# Patient Record
Sex: FEMALE | Race: ASIAN | NOT HISPANIC OR LATINO | URBAN - METROPOLITAN AREA
[De-identification: names, ages, dates, MRNs, and addresses within clinical notes are randomized per-mention and may not be internally consistent; named-entity substitution may affect disease eponyms.]

---
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
## 2024-01-04 ENCOUNTER — PATIENT MESSAGE (OUTPATIENT)
Dept: OBSTETRICS AND GYNECOLOGY | Facility: CLINIC | Age: 19
End: 2024-01-04
Payer: COMMERCIAL

## 2024-01-08 ENCOUNTER — TELEPHONE (OUTPATIENT)
Dept: OBSTETRICS AND GYNECOLOGY | Facility: CLINIC | Age: 19
End: 2024-01-08
Payer: COMMERCIAL

## 2024-01-09 ENCOUNTER — OFFICE VISIT (OUTPATIENT)
Dept: OBSTETRICS AND GYNECOLOGY | Facility: CLINIC | Age: 19
End: 2024-01-09
Payer: COMMERCIAL

## 2024-01-09 VITALS
BODY MASS INDEX: 19.71 KG/M2 | DIASTOLIC BLOOD PRESSURE: 68 MMHG | SYSTOLIC BLOOD PRESSURE: 104 MMHG | WEIGHT: 107.13 LBS | HEIGHT: 62 IN

## 2024-01-09 DIAGNOSIS — Z00.00 WELL WOMAN EXAM WITHOUT GYNECOLOGICAL EXAM: Primary | ICD-10-CM

## 2024-01-09 DIAGNOSIS — Z30.41 ORAL CONTRACEPTIVE PILL SURVEILLANCE: ICD-10-CM

## 2024-01-09 DIAGNOSIS — Z11.3 ROUTINE SCREENING FOR STI (SEXUALLY TRANSMITTED INFECTION): ICD-10-CM

## 2024-01-09 PROCEDURE — 1159F MED LIST DOCD IN RCRD: CPT | Mod: CPTII,S$GLB,, | Performed by: STUDENT IN AN ORGANIZED HEALTH CARE EDUCATION/TRAINING PROGRAM

## 2024-01-09 PROCEDURE — 99395 PREV VISIT EST AGE 18-39: CPT | Mod: S$GLB,,, | Performed by: STUDENT IN AN ORGANIZED HEALTH CARE EDUCATION/TRAINING PROGRAM

## 2024-01-09 PROCEDURE — 3078F DIAST BP <80 MM HG: CPT | Mod: CPTII,S$GLB,, | Performed by: STUDENT IN AN ORGANIZED HEALTH CARE EDUCATION/TRAINING PROGRAM

## 2024-01-09 PROCEDURE — 1160F RVW MEDS BY RX/DR IN RCRD: CPT | Mod: CPTII,S$GLB,, | Performed by: STUDENT IN AN ORGANIZED HEALTH CARE EDUCATION/TRAINING PROGRAM

## 2024-01-09 PROCEDURE — 3008F BODY MASS INDEX DOCD: CPT | Mod: CPTII,S$GLB,, | Performed by: STUDENT IN AN ORGANIZED HEALTH CARE EDUCATION/TRAINING PROGRAM

## 2024-01-09 PROCEDURE — 99999 PR PBB SHADOW E&M-EST. PATIENT-LVL III: CPT | Mod: PBBFAC,,, | Performed by: STUDENT IN AN ORGANIZED HEALTH CARE EDUCATION/TRAINING PROGRAM

## 2024-01-09 PROCEDURE — 3074F SYST BP LT 130 MM HG: CPT | Mod: CPTII,S$GLB,, | Performed by: STUDENT IN AN ORGANIZED HEALTH CARE EDUCATION/TRAINING PROGRAM

## 2024-01-09 RX ORDER — DROSPIRENONE AND ETHINYL ESTRADIOL 0.02-3(28)
1 KIT ORAL DAILY
Qty: 84 TABLET | Refills: 4 | Status: SHIPPED | OUTPATIENT
Start: 2024-01-09 | End: 2024-01-25 | Stop reason: SDUPTHER

## 2024-01-09 NOTE — PROGRESS NOTES
HPI:  HPI: Pt is a 18 y.o.  female who presents for routine annual/birth control refill. Here with mom.   - taking COCs for skin benefits and now also for pregnancy prevention  - 1 lifetime sexual partner, feels safe, sexual acts are consensual; they are monogamous   - has questions about nexplanon    Contraception: yolie  STI screening: agrees to gc/ct screen     Cervical cancer screening:  due age 20   Gardasil: 3 of 3 complete  Breast cancer screening: n/a  Colon cancer screening: n/a    Family history   Patient is adopted    ROS:  GENERAL: Feeling well overall. Denies fever or chills.   SKIN: Denies rash or lesions.   HEAD: Denies head injury or headache.   NODES: Denies enlarged lymph nodes.   CHEST: Denies chest pain or shortness of breath.   CARDIOVASCULAR: Denies palpitations or left sided chest pain.   ABDOMEN: No abdominal pain, constipation, diarrhea, nausea, vomiting or rectal bleeding.   URINARY: No dysuria, hematuria, or burning on urination.  REPRODUCTIVE: See HPI.   BREASTS: Denies pain or lumps  HEMATOLOGIC: No easy bruisability or excessive bleeding.   MUSCULOSKELETAL: Denies joint pain or swelling.   NEUROLOGIC: Denies syncope or weakness.   PSYCHIATRIC: Denies depression, anxiety or mood swings.    PE:   APPEARANCE: Well nourished, well developed female in no acute distress.  RESPIRATORY: normal respiratory effort  EXTREMITIES: normal ROM, no edema bilaterally  NEURO: alert and oriented, normal gait  PSYCH: normal mood and affect      Diagnosis:  1. Well woman exam without gynecological exam    2. Routine screening for STI (sexually transmitted infection)    3. Oral contraceptive pill surveillance        Plan:     Orders Placed This Encounter    C. trachomatis/N. gonorrhoeae by AMP DNA    drospirenone-ethinyl estradioL (JASMIEL, Michael,) 3-0.02 mg per tablet     Discussed pros/cons continued LANETTE use vs nexplanon use including improved efficacy of nexplanon as well as potential side effects. For now she  fever will continue her pill. She is very good about daily adherence. She will let me know if this changes and she begins missing doses so we can arrange nexplanon, but for now continue oral birth control    Pt seen alongside mom as well as interviewed alone.     F/u 1 year or sooner as needed    Alta Roberts MD  Obstetrics & Gynecology   [facility]

## 2024-01-25 DIAGNOSIS — Z30.41 ORAL CONTRACEPTIVE PILL SURVEILLANCE: ICD-10-CM

## 2024-01-25 DIAGNOSIS — L20.89 OTHER ATOPIC DERMATITIS: ICD-10-CM

## 2024-01-25 RX ORDER — DROSPIRENONE AND ETHINYL ESTRADIOL 0.02-3(28)
1 KIT ORAL DAILY
Qty: 84 TABLET | Refills: 4 | Status: SHIPPED | OUTPATIENT
Start: 2024-01-25

## 2024-01-25 NOTE — TELEPHONE ENCOUNTER
----- Message from Katelyn Abreu sent at 1/25/2024  9:58 AM CST -----  Pt is calling to get a refill on drospirenone-ethinyl estradioL (JASMIEL, Michael,) 3-0.02 mg per tablet pt# [phone]    [facility]  [address] [place] [id number]  [phone]

## 2024-03-13 ENCOUNTER — OFFICE VISIT (OUTPATIENT)
Dept: INTERNAL MEDICINE | Facility: CLINIC | Age: 19
End: 2024-03-13
Payer: COMMERCIAL

## 2024-03-13 VITALS
OXYGEN SATURATION: 98 % | SYSTOLIC BLOOD PRESSURE: 110 MMHG | BODY MASS INDEX: 21.59 KG/M2 | WEIGHT: 117.31 LBS | DIASTOLIC BLOOD PRESSURE: 60 MMHG | HEART RATE: 90 BPM | HEIGHT: 62 IN

## 2024-03-13 DIAGNOSIS — J02.9 PHARYNGITIS, UNSPECIFIED ETIOLOGY: Primary | ICD-10-CM

## 2024-03-13 LAB
CTP QC/QA: YES
CTP QC/QA: YES
MOLECULAR STREP A: NEGATIVE
SARS-COV-2 RDRP RESP QL NAA+PROBE: NEGATIVE

## 2024-03-13 PROCEDURE — 3074F SYST BP LT 130 MM HG: CPT | Mod: CPTII,S$GLB,, | Performed by: INTERNAL MEDICINE

## 2024-03-13 PROCEDURE — 3078F DIAST BP <80 MM HG: CPT | Mod: CPTII,S$GLB,, | Performed by: INTERNAL MEDICINE

## 2024-03-13 PROCEDURE — 1159F MED LIST DOCD IN RCRD: CPT | Mod: CPTII,S$GLB,, | Performed by: INTERNAL MEDICINE

## 2024-03-13 PROCEDURE — 3008F BODY MASS INDEX DOCD: CPT | Mod: CPTII,S$GLB,, | Performed by: INTERNAL MEDICINE

## 2024-03-13 PROCEDURE — 99999 PR PBB SHADOW E&M-EST. PATIENT-LVL IV: CPT | Mod: PBBFAC,,, | Performed by: INTERNAL MEDICINE

## 2024-03-13 PROCEDURE — 99213 OFFICE O/P EST LOW 20 MIN: CPT | Mod: S$GLB,,, | Performed by: INTERNAL MEDICINE

## 2024-03-13 PROCEDURE — 87635 SARS-COV-2 COVID-19 AMP PRB: CPT | Mod: QW,S$GLB,, | Performed by: INTERNAL MEDICINE

## 2024-03-13 PROCEDURE — 87651 STREP A DNA AMP PROBE: CPT | Mod: QW,S$GLB,, | Performed by: INTERNAL MEDICINE

## 2024-03-13 PROCEDURE — 1160F RVW MEDS BY RX/DR IN RCRD: CPT | Mod: CPTII,S$GLB,, | Performed by: INTERNAL MEDICINE

## 2024-03-13 RX ORDER — CEPHALEXIN 500 MG/1
500 CAPSULE ORAL EVERY 12 HOURS
Qty: 20 CAPSULE | Refills: 0 | Status: SHIPPED | OUTPATIENT
Start: 2024-03-13

## 2024-03-13 NOTE — PATIENT INSTRUCTIONS
Ibuprofen 400 mg up to three times a day for pain relief.   Start cephalexin (Keflex) 500 mg 1 capsule twice a day for 10 days.   Warm salt water gargles.    Return to clinic as needed.

## 2024-03-13 NOTE — PROGRESS NOTES
Subjective:       Patient ID: Jessenia Granados is a 18 y.o. female.    Chief Complaint: Sore Throat      HPI  Jessenia Granados is a 18 y.o. year old female presents for urgent care evaluation of sore throat starting 4 days ago. Patient denies fever or chills.   Sore throat, stabbing pain, 3/10 in severity. Denies cough, no known sick contacts. Does have known seasonal allergies; takes zyrtec daily.    Review of Systems   Constitutional:  Negative for activity change, appetite change, fatigue, fever and unexpected weight change.   HENT:  Positive for sore throat and trouble swallowing. Negative for congestion, hearing loss, postnasal drip, sinus pressure, sinus pain, sneezing and voice change.    Eyes:  Negative for pain and discharge.   Respiratory:  Negative for cough, choking, chest tightness, shortness of breath and wheezing.    Cardiovascular:  Negative for chest pain, palpitations and leg swelling.   Gastrointestinal:  Negative for abdominal distention, abdominal pain, blood in stool, constipation, diarrhea, nausea and vomiting.   Endocrine: Negative for polydipsia and polyuria.   Genitourinary:  Negative for difficulty urinating and flank pain.   Musculoskeletal:  Negative for arthralgias, back pain, joint swelling, myalgias and neck pain.   Skin:  Negative for rash.   Neurological:  Negative for dizziness, tremors, seizures, weakness, numbness and headaches.   Psychiatric/Behavioral:  Negative for agitation. The patient is not nervous/anxious.          Past Medical History:   Diagnosis Date    ADHD (attention deficit hyperactivity disorder)     combined type    Amblyopia     Ansio- Amblyopia OS    Astigmatism of left eye     Behavior causing concern in adopted child 5/6/2014    Headache(784.0)         Prior to Admission medications    Medication Sig Start Date End Date Taking? Authorizing Provider   betamethasone dipropionate 0.05 % cream Apply 1 application topically 2 (two) times daily. 3/24/22   "Yes Provider, Historical   cetirizine (ZYRTEC) 10 MG tablet Take 10 mg by mouth once daily.   Yes Provider, Historical   dextroamphetamine (DEXEDRINE SPANSULE) 15 MG 24 hr capsule  5/25/17  Yes Provider, Historical   drospirenone-ethinyl estradioL (JASMIEL, 28,) 3-0.02 mg per tablet Take 1 tablet by mouth once daily. 1/25/24  Yes Alta Roberts MD   ibuprofen (ADVIL,MOTRIN) 600 MG tablet Starting the day before your period, take 1 tablet every 8 hours for 5-7 days 12/8/21  Yes Alta Roberts MD   OPZELURA 1.5 % Crea Apply topically. 11/28/22  Yes Provider, Historical   risperidone (RISPERDAL) 0.5 MG Tab  5/25/17  Yes Provider, Historical   sertraline (ZOLOFT) 100 MG tablet Take 100 mg by mouth. 4/3/23  Yes Provider, Historical   triamcinolone acetonide 0.1% (KENALOG) 0.1 % ointment AAA bid to fingertips PRN severe rash x 1-2 weeks until resolves 7/15/19  Yes Ashely Monae MD   VYVANSE 40 mg Cap  7/25/18  Yes Provider, Historical   fluticasone propionate (CUTIVATE) 0.005 % ointment Apply topically 2 (two) times daily.  Patient not taking: Reported on 1/9/2024 10/10/20   Inna Trevino MD   hydrOXYzine HCl (ATARAX) 25 MG tablet Take 1 tablet (25 mg total) by mouth 3 (three) times daily as needed for Itching.  Patient not taking: Reported on 1/9/2024 8/30/19   Pipo Ríos, NP   sertraline (ZOLOFT) 25 MG tablet 30 mg.  6/9/16   Provider, Historical        Past medical history, surgical history, and family medical history reviewed and updated as appropriate.    Medications and allergies reviewed.     Objective:          Vitals:    03/13/24 1517   BP: 110/60   BP Location: Right arm   Patient Position: Sitting   Pulse: 90   SpO2: 98%   Weight: 53.2 kg (117 lb 4.6 oz)   Height: 5' 2" (1.575 m)     Body mass index is 21.45 kg/m².  Physical Exam  Constitutional:       Appearance: She is well-developed.   HENT:      Head: Normocephalic and atraumatic.      Mouth/Throat:      Pharynx: Posterior " oropharyngeal erythema present.      Comments: Enlarged tonsils, erythematous, no exudates noted  Erythematous oropharynx  Cardiovascular:      Rate and Rhythm: Normal rate and regular rhythm.      Heart sounds: Normal heart sounds.   Pulmonary:      Effort: Pulmonary effort is normal. No respiratory distress.      Breath sounds: Normal breath sounds. No wheezing.   Abdominal:      General: Bowel sounds are normal. There is no distension.      Palpations: Abdomen is soft.      Tenderness: There is no abdominal tenderness.   Musculoskeletal:         General: No tenderness. Normal range of motion.      Cervical back: Normal range of motion.   Skin:     General: Skin is warm and dry.   Neurological:      Mental Status: She is alert and oriented to person, place, and time.      Cranial Nerves: No cranial nerve deficit.      Deep Tendon Reflexes: Reflexes are normal and symmetric.         Lab Results   Component Value Date    WBC 11.15 08/18/2005    HGB 14.0 07/31/2017    HCT 42.7 (H) 08/18/2005     08/18/2005    CHOL 181 03/20/2021    TRIG 51 03/20/2021    HDL 89 (H) 03/20/2021    TSH 1.3 08/18/2005    GLUF 79 03/20/2021       Assessment:       1. Pharyngitis, unspecified etiology          Plan:     Jessenia Morgan was seen today for sore throat.    Diagnoses and all orders for this visit:    Pharyngitis, unspecified etiology  -     POCT Strep A, Molecular  -     POCT COVID-19 Rapid Screening  -     POCT Influenza A/B Molecular  -     cephALEXin (KEFLEX) 500 MG capsule; Take 1 capsule (500 mg total) by mouth every 12 (twelve) hours.        Health maintenance reviewed with patient.     Follow up if symptoms worsen or fail to improve.    Gerson Keane MD  Internal Medicine / Primary Care  [facility]  3/13/2024

## 2024-07-10 ENCOUNTER — TELEPHONE (OUTPATIENT)
Dept: OBSTETRICS AND GYNECOLOGY | Facility: CLINIC | Age: 19
End: 2024-07-10
Payer: COMMERCIAL

## 2024-07-10 DIAGNOSIS — Z30.41 ORAL CONTRACEPTIVE PILL SURVEILLANCE: ICD-10-CM

## 2024-07-10 RX ORDER — DROSPIRENONE AND ETHINYL ESTRADIOL 0.02-3(28)
1 KIT ORAL
Qty: 84 TABLET | Refills: 1 | Status: SHIPPED | OUTPATIENT
Start: 2024-07-10

## 2024-07-10 NOTE — TELEPHONE ENCOUNTER
----- Message from Dariusz Pennington sent at 7/10/2024 12:00 PM CDT -----  Type: Patient Call Back    Who called:Areli Bass    What is the request in detail:In regards to proxy paper work    Can the clinic reply by [facility]?No    Would the patient rather a call back or a response via [facility]? Call    Best call back number:.[phone]    Additional Information:

## 2024-07-10 NOTE — TELEPHONE ENCOUNTER
Spoke with pt who state she would like to give her mother proxy access. Proxy acces was  renewed per pts request. Bob colmenares

## 2024-07-10 NOTE — TELEPHONE ENCOUNTER
Refill Decision Note   Jessenia Granados  is requesting a refill authorization.  Brief Assessment and Rationale for Refill:  Approve     Medication Therapy Plan: Rx sent to requested pharmacy since listed as preferred location in chart      Comments:     Note composed:4:08 PM 07/10/2024

## 2024-08-21 ENCOUNTER — PATIENT MESSAGE (OUTPATIENT)
Dept: OBSTETRICS AND GYNECOLOGY | Facility: CLINIC | Age: 19
End: 2024-08-21
Payer: COMMERCIAL

## 2024-08-21 ENCOUNTER — OFFICE VISIT (OUTPATIENT)
Dept: URGENT CARE | Facility: CLINIC | Age: 19
End: 2024-08-21
Payer: COMMERCIAL

## 2024-08-21 VITALS
BODY MASS INDEX: 19.39 KG/M2 | RESPIRATION RATE: 15 BRPM | HEIGHT: 62 IN | DIASTOLIC BLOOD PRESSURE: 66 MMHG | SYSTOLIC BLOOD PRESSURE: 90 MMHG | TEMPERATURE: 98 F | WEIGHT: 105.38 LBS | OXYGEN SATURATION: 99 % | HEART RATE: 96 BPM

## 2024-08-21 DIAGNOSIS — Z30.41 ORAL CONTRACEPTIVE PILL SURVEILLANCE: ICD-10-CM

## 2024-08-21 DIAGNOSIS — S69.92XA INJURY OF LEFT MIDDLE FINGER, INITIAL ENCOUNTER: Primary | ICD-10-CM

## 2024-08-21 DIAGNOSIS — S60.453A FOREIGN BODY OF LEFT MIDDLE FINGER: ICD-10-CM

## 2024-08-21 PROCEDURE — 73140 X-RAY EXAM OF FINGER(S): CPT | Mod: LT,S$GLB,, | Performed by: RADIOLOGY

## 2024-08-21 PROCEDURE — 99213 OFFICE O/P EST LOW 20 MIN: CPT | Mod: S$GLB,,, | Performed by: FAMILY MEDICINE

## 2024-08-21 RX ORDER — FLUOCINONIDE TOPICAL SOLUTION USP, 0.05% 0.5 MG/ML
SOLUTION TOPICAL 2 TIMES DAILY PRN
COMMUNITY
Start: 2024-08-13

## 2024-08-21 RX ORDER — CLOBETASOL PROPIONATE 0.5 MG/G
AEROSOL, FOAM TOPICAL 2 TIMES DAILY PRN
COMMUNITY
Start: 2024-08-14

## 2024-08-21 RX ORDER — CEPHALEXIN 500 MG/1
500 CAPSULE ORAL EVERY 8 HOURS
Qty: 30 CAPSULE | Refills: 0 | Status: SHIPPED | OUTPATIENT
Start: 2024-08-21 | End: 2024-08-31

## 2024-08-21 RX ORDER — DROSPIRENONE AND ETHINYL ESTRADIOL 0.02-3(28)
1 KIT ORAL DAILY
Qty: 84 TABLET | Refills: 1 | Status: SHIPPED | OUTPATIENT
Start: 2024-08-21

## 2024-08-21 NOTE — PROGRESS NOTES
"Subjective:      Patient ID: Jessenia Granados is a 19 y.o. female.    Vitals:  height is 5' 2" (1.575 m) and weight is 47.8 kg (105 lb 6.1 oz). Her oral temperature is 97.9 °F (36.6 °C). Her blood pressure is 90/66 and her pulse is 96. Her respiration is 15 and oxygen saturation is 99%.     Chief Complaint: Hand Pain    Pt presents today w/ lt middle finger injury ; onset approx 6x days ago. Pt reports she noticed a superficial wound to her lt middle fingertip; c/o tenderness of the area when pressure applied. Pt denies loss of ROM, loss of sensation , swelling , drainage , fever , emesis and loss of ability to bear full weight. Pt  cleaned the affected area with peroxide ;mild relief. Pt last tdap was in 2015.   Does not recall any injury or trauma, Does have CAT at home, but does not recall any bite or scratch      Hand Pain   The incident occurred 5 to 7 days ago. The incident occurred at home. The injury mechanism is unknown. Pain location: lt middle finger. The quality of the pain is described as aching. The pain does not radiate. The pain is at a severity of 1/10. The pain is mild. The pain has been Intermittent since the incident. Pertinent negatives include no chest pain, muscle weakness, numbness or tingling. The symptoms are aggravated by movement and palpation. Treatments tried: perioxide. The treatment provided mild relief.       Cardiovascular:  Negative for chest pain.   Neurological:  Negative for numbness.      Objective:     Physical Exam   Constitutional: She is oriented to person, place, and time. She appears well-developed. She is cooperative.  Non-toxic appearance. She does not appear ill. No distress.   HENT:   Head: Normocephalic and atraumatic.   Ears:   Right Ear: Hearing, tympanic membrane, external ear and ear canal normal.   Left Ear: Hearing, tympanic membrane, external ear and ear canal normal.   Nose: Nose normal. No mucosal edema, rhinorrhea or nasal deformity. No epistaxis. " Right sinus exhibits no maxillary sinus tenderness and no frontal sinus tenderness. Left sinus exhibits no maxillary sinus tenderness and no frontal sinus tenderness.   Mouth/Throat: Uvula is midline, oropharynx is clear and moist and mucous membranes are normal. No trismus in the jaw. Normal dentition. No uvula swelling. No posterior oropharyngeal erythema.   Eyes: Conjunctivae and lids are normal. Right eye exhibits no discharge. Left eye exhibits no discharge. No scleral icterus.   Neck: Trachea normal and phonation normal. Neck supple.   Cardiovascular: Normal rate, regular rhythm, normal heart sounds and normal pulses.   Pulmonary/Chest: Effort normal and breath sounds normal. No respiratory distress.   Abdominal: Normal appearance and bowel sounds are normal. She exhibits no distension and no mass. Soft. There is no abdominal tenderness.   Musculoskeletal: Normal range of motion.         General: No deformity. Normal range of motion.      Comments: Left middle finger volar aspect distal phalanx with small abraised area, minimal ttp  No red streaking  FROM     Neurological: She is alert and oriented to person, place, and time. She exhibits normal muscle tone. Coordination normal.   Skin: Skin is warm, dry, intact, not diaphoretic and not pale.   Psychiatric: Her speech is normal and behavior is normal. Judgment and thought content normal.   Nursing note and vitals reviewed.      Assessment:     1. Injury of left middle finger, initial encounter    2. Foreign body of left middle finger        Plan:       Injury of left middle finger, initial encounter  -     X-Ray Finger 2 or More Views Left; Future; Expected date: 08/21/2024    Foreign body of left middle finger    Other orders  -     cephALEXin (KEFLEX) 500 MG capsule; Take 1 capsule (500 mg total) by mouth every 8 (eight) hours. for 10 days  Dispense: 30 capsule; Refill: 0      Area explored after instillating 1 ml of lidocaine plain, small ? FB removed, band  aid place  No sign of infection

## 2024-09-23 ENCOUNTER — PATIENT MESSAGE (OUTPATIENT)
Dept: OPTOMETRY | Facility: CLINIC | Age: 19
End: 2024-09-23
Payer: COMMERCIAL

## 2024-11-04 ENCOUNTER — TELEPHONE (OUTPATIENT)
Dept: OBSTETRICS AND GYNECOLOGY | Facility: CLINIC | Age: 19
End: 2024-11-04
Payer: COMMERCIAL

## 2024-12-23 ENCOUNTER — OFFICE VISIT (OUTPATIENT)
Dept: OPTOMETRY | Facility: CLINIC | Age: 19
End: 2024-12-23
Payer: COMMERCIAL

## 2024-12-23 DIAGNOSIS — Z46.0 FITTING AND ADJUSTMENT OF SPECTACLES AND CONTACT LENSES: Primary | ICD-10-CM

## 2024-12-23 DIAGNOSIS — H52.223 REGULAR ASTIGMATISM OF BOTH EYES: Primary | ICD-10-CM

## 2024-12-23 DIAGNOSIS — Z30.41 ORAL CONTRACEPTIVE PILL SURVEILLANCE: ICD-10-CM

## 2024-12-23 PROCEDURE — 92014 COMPRE OPH EXAM EST PT 1/>: CPT | Mod: S$GLB,,, | Performed by: OPTOMETRIST

## 2024-12-23 PROCEDURE — 1159F MED LIST DOCD IN RCRD: CPT | Mod: CPTII,S$GLB,, | Performed by: OPTOMETRIST

## 2024-12-23 PROCEDURE — 99999 PR PBB SHADOW E&M-EST. PATIENT-LVL II: CPT | Mod: PBBFAC,,, | Performed by: OPTOMETRIST

## 2024-12-23 PROCEDURE — 92310 CONTACT LENS FITTING OU: CPT | Mod: CSM,,, | Performed by: OPTOMETRIST

## 2024-12-23 RX ORDER — LIDOCAINE 30 MG/G
CREAM TOPICAL
COMMUNITY
Start: 2024-12-16

## 2024-12-23 RX ORDER — DROSPIRENONE AND ETHINYL ESTRADIOL 0.02-3(28)
1 KIT ORAL DAILY
Qty: 84 TABLET | Refills: 1 | Status: SHIPPED | OUTPATIENT
Start: 2024-12-23

## 2024-12-23 NOTE — Clinical Note
Hi! Please Order contact lens trials (2 boxes each) OD Total 30 8.4/14.2 -1.25 OS Total 30 for Astigmatism 8.6/14.5 +0.50 -2.75 x 180   Thank you!

## 2024-12-23 NOTE — TELEPHONE ENCOUNTER
----- Message from Matt sent at 12/23/2024 11:10 AM CST -----  Regarding: Self [phone]  Type: RX Refill Request    Who Called:  Self     Have you contacted your pharmacy: yes     Refill     RX Name and Strength: drospirenone-ethinyl estradioL (LO-ZUMANDIMINE, 28,) 3-0.02 mg per tablet    Preferred Pharmacy with phone number:   [facility] - [place] - [address]  [address] [place] [address]  Phone: [phone] Fax: [phone]    Local or Mail Order:    Would the patient rather a call back or a response via [facility]? Call back     Best Call Back Number: [phone]     Additional Information: stated that she got 1 month supply, but needs the other 2 month supplies. Stated she is now out of the medication.     Thank you.

## 2024-12-23 NOTE — PROGRESS NOTES
HPI    Jessenia Granados is a 19 y.o. female who comes in with her mother, Arron,   for continued eye care. Jessenia has myopia of right eye, moderate astigmatism   of the left eye, and consequential amblyopia of the left eye.  Her last   exam with me was on 04/24/2023. At that she dispense Ultra for Astigmatism   and Air Optix Hydraglyde contact lenses were dispensed for trial. Today,   Jessenia explains that the contacts were causing irritation so she   discontinued them. She adds that her vision in her left eye isn't as sharp   as she would like it to be.    (--)blurred vision  (--)Headaches  (--)diplopia  (--)flashes  (--)floaters  (--)pain  (--)Itching  (--)tearing  (--)burning  (--)Dryness  (--) OTC Drops  (--)Photophobia      Last edited by Lobo Robledo, OD on 12/23/2024  1:57 PM.      Review of Systems   Constitutional:  Negative for chills, fever and malaise/fatigue.   HENT:  Negative for congestion.    Eyes:  Positive for blurred vision. Negative for double vision, photophobia, pain, discharge and redness.   Respiratory:  Negative for cough.    Gastrointestinal:  Negative for nausea and vomiting.   Neurological:  Negative for seizures.     For exam results, see encounter report    Assessment /Plan    1. 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 Good ocular health    Patient education; [facility] weeks; otherwise, RTC in 1 year, sooner as neededat [facility]

## 2025-01-30 ENCOUNTER — PATIENT MESSAGE (OUTPATIENT)
Dept: OPTOMETRY | Facility: CLINIC | Age: 20
End: 2025-01-30
Payer: COMMERCIAL

## 2025-01-31 ENCOUNTER — TELEPHONE (OUTPATIENT)
Dept: OPTOMETRY | Facility: CLINIC | Age: 20
End: 2025-01-31
Payer: COMMERCIAL

## 2025-01-31 NOTE — TELEPHONE ENCOUNTER
Talked to Jessenia to correct message that was sent to them indicating that contact lens trial were to be picked up at Cl department or [facility] [place] instead they will be ready for  at the Pediatric building check in desk by Monday morning 02/02/2025 and a new message will be send Monday morning with  location and times.

## 2025-02-17 DIAGNOSIS — Z30.41 ORAL CONTRACEPTIVE PILL SURVEILLANCE: ICD-10-CM

## 2025-02-17 RX ORDER — DROSPIRENONE AND ETHINYL ESTRADIOL 0.02-3(28)
1 KIT ORAL
Qty: 84 TABLET | Refills: 0 | Status: SHIPPED | OUTPATIENT
Start: 2025-02-17

## 2025-02-17 NOTE — TELEPHONE ENCOUNTER
Refill Routing Note   Medication(s) are not appropriate for processing by [facility] for the following reason(s):        Patient not seen by provider within 15 months    [facility] action(s):  Defer               Appointments  past 12m or future 3m with PCP    Date Provider   Last Visit   1/9/2024 Alta Roberts MD   Next Visit   Visit date not found Alta Roberts MD   ED visits in past 90 days: 0        Note composed:1:49 PM 02/17/2025

## 2025-03-10 ENCOUNTER — TELEPHONE (OUTPATIENT)
Dept: OPHTHALMOLOGY | Facility: CLINIC | Age: 20
End: 2025-03-10
Payer: COMMERCIAL

## 2025-05-12 DIAGNOSIS — Z30.41 ORAL CONTRACEPTIVE PILL SURVEILLANCE: ICD-10-CM

## 2025-05-12 NOTE — TELEPHONE ENCOUNTER
----- Message from Loli sent at 5/12/2025  2:34 PM CDT -----  Can the clinic reply in [facility]:YPlease refill the medication(s) listed below. Please call the patient when the prescription(s) is ready for  at this phone number   Medication #1 LO-ZUMANDIMINE, 28, 3-0.02 mg per tabletMedication #2 Preferred Pharmacy: [facility] ([place] - [address] Phone: [phone]: [phone]

## 2025-05-13 RX ORDER — DROSPIRENONE AND ETHINYL ESTRADIOL 0.02-3(28)
1 KIT ORAL DAILY
Qty: 84 TABLET | Refills: 0 | Status: SHIPPED | OUTPATIENT
Start: 2025-05-13

## 2025-05-20 ENCOUNTER — OFFICE VISIT (OUTPATIENT)
Dept: OBSTETRICS AND GYNECOLOGY | Facility: CLINIC | Age: 20
End: 2025-05-20
Payer: COMMERCIAL

## 2025-05-20 VITALS
BODY MASS INDEX: 18.86 KG/M2 | HEIGHT: 62 IN | DIASTOLIC BLOOD PRESSURE: 66 MMHG | SYSTOLIC BLOOD PRESSURE: 101 MMHG | WEIGHT: 102.5 LBS | HEART RATE: 85 BPM

## 2025-05-20 DIAGNOSIS — Z11.3 ROUTINE SCREENING FOR STI (SEXUALLY TRANSMITTED INFECTION): ICD-10-CM

## 2025-05-20 DIAGNOSIS — Z12.4 CERVICAL CANCER SCREENING: Primary | ICD-10-CM

## 2025-05-20 DIAGNOSIS — Z30.41 ORAL CONTRACEPTIVE PILL SURVEILLANCE: ICD-10-CM

## 2025-05-20 DIAGNOSIS — N76.0 ACUTE VAGINITIS: ICD-10-CM

## 2025-05-20 PROCEDURE — 99999 PR PBB SHADOW E&M-EST. PATIENT-LVL III: CPT | Mod: PBBFAC,,, | Performed by: STUDENT IN AN ORGANIZED HEALTH CARE EDUCATION/TRAINING PROGRAM

## 2025-05-20 PROCEDURE — 81515 NFCT DS BV&VAGINITIS DNA ALG: CPT | Performed by: STUDENT IN AN ORGANIZED HEALTH CARE EDUCATION/TRAINING PROGRAM

## 2025-05-20 PROCEDURE — 87491 CHLMYD TRACH DNA AMP PROBE: CPT | Performed by: STUDENT IN AN ORGANIZED HEALTH CARE EDUCATION/TRAINING PROGRAM

## 2025-05-20 RX ORDER — DROSPIRENONE AND ETHINYL ESTRADIOL 0.02-3(28)
1 KIT ORAL DAILY
Qty: 84 TABLET | Refills: 4 | Status: SHIPPED | OUTPATIENT
Start: 2025-05-20

## 2025-05-20 NOTE — PROGRESS NOTES
"ROUTINE ANNUAL GYN VISIT    HPI: This is a 19 y.o.    who presents for routine annual exam.   Concerns brought up today:  Vaginal discharge - recently treated for yeast/bv; was better but feels like the clumpy white discharge is returning. No irritation or itching.  Dyspareunia - severe L sided pelvic pain with sex. Inconsistent (not every time). Says had pelvic US done with student [facility] that was normal.     Contraception: COCs  STI screening: agrees to gc/ct    Cervical cancer screening:     Most recent: n/a   History abnormal: no   Gardasil: 3/3 complete    Breast cancer screening: n/a, due age 39   Colon cancer screening: n/a, due age 44    Family history breast cancer: no  Family history ovarian cancer: no  Family history colon cancer: no      OB History    Para Term  AB Living   0 0 0 0 0 0   SAB IAB Ectopic Multiple Live Births   0 0 0 0 0          OBJECTIVE:   /66 (Patient Position: Sitting)   Pulse 85   Ht 5' 2" (1.575 m)   Wt 46.5 kg (102 lb 8.2 oz)   LMP 05/15/2025   BMI 18.75 kg/m²      PE:   APPEARANCE: Well nourished, well developed, no acute distress.  NODES: no inguinal lymphadenopathy  BREASTS: Symmetrical, no skin changes or visible lesions. No palpable masses, nipple discharge or adenopathy bilaterally.  ABDOMEN: Soft. No tenderness or masses. No distention.   VULVA: No lesions. Normal external female genitalia.  URETHRAL MEATUS: Normal size and location, no lesions, no prolapse.  URETHRA: No masses, tenderness, or prolapse.  VAGINA: Moist. No lesions or lacerations noted. No abnormal discharge present. No odor present.   CERVIX: No lesions or discharge. No cervical motion tenderness.   UTERUS: Normal size, regular shape, mobile, non-tender.  ADNEXA: No tenderness. No fullness or masses palpated in the adnexal regions.   ANUS PERINEUM: Normal.      Diagnosis:  1. Cervical cancer screening    2. Oral contraceptive pill surveillance    3. Acute vaginitis    4. " Routine screening for STI (sexually transmitted infection)        Plan:     Jessenia Morgan was seen today for well woman.    Diagnoses and all orders for this visit:    Cervical cancer screening  -     Liquid-Based Pap Smear, Screening    Oral contraceptive pill surveillance  -     drospirenone-ethinyl estradioL (LO-ZUMANDIMINE, 28,) 3-0.02 mg per tablet; Take 1 tablet by mouth once daily.    Acute vaginitis  -     Vaginosis Screen by DNA Probe    Routine screening for STI (sexually transmitted infection)  -     C. trachomatis/N. gonorrhoeae by AMP DNA      WWE- pap collected (will be 20 in 2 weeks), gc/ct sent  OCP refill sent  Affirm sent  Pt will email us her pelvic US results. Declines PFPT referral for now as her partner is out of state for the summer so she does not expect for the pain to be an issue.  She can send message for PFPT referral if/when needed.    Patient was counseled today on the current  ACS guidelines for cervical cytology screening as well as the current recommendations for breast cancer screening.   She was counseled to follow up with her PCP for other routine health maintenance.        RTC 1 year or sooner PRN

## 2025-05-22 LAB
BACTERIAL VAGINOSIS DNA (OHS): NOT DETECTED
C TRACH DNA SPEC QL NAA+PROBE: NOT DETECTED
CANDIDA GLABRATA/KRUSEI DNA (OHS): NOT DETECTED
CANDIDA SPECIES DNA (OHS): DETECTED
CTGC SOURCE (OHS) ORD-325: NORMAL
N GONORRHOEA DNA UR QL NAA+PROBE: NOT DETECTED
TRICHOMONAS VAGINALIS DNA (OHS): NOT DETECTED

## 2025-05-23 ENCOUNTER — RESULTS FOLLOW-UP (OUTPATIENT)
Dept: OBSTETRICS AND GYNECOLOGY | Facility: CLINIC | Age: 20
End: 2025-05-23

## 2025-05-23 RX ORDER — FLUCONAZOLE 150 MG/1
TABLET ORAL
Qty: 1 TABLET | Refills: 1 | Status: SHIPPED | OUTPATIENT
Start: 2025-05-23

## 2025-05-26 ENCOUNTER — TELEPHONE (OUTPATIENT)
Dept: OBSTETRICS AND GYNECOLOGY | Facility: CLINIC | Age: 20
End: 2025-05-26
Payer: COMMERCIAL

## 2025-05-26 NOTE — TELEPHONE ENCOUNTER
Reached out to pt to discuss refill request. Pt did not answer, left vm for pt to give the office a call back    Will send pt portal message   ----- Message from Loli sent at 5/26/2025 12:16 PM CDT -----  Can the clinic reply in [facility]:YPlease refill the medication(s) listed below. Please call the patient when the prescription(s) is ready for  at this phone number   Medication #1 fluconazole (DIFLUCAN) 150 MG TabMedication #2 Preferred Pharmacy: [facility] ([place] - [address] Phone: [phone]: [phone]

## 2025-05-26 NOTE — TELEPHONE ENCOUNTER
Returned pts call. Discussed prescription refill request w/ pt. Advised pt to contact pharmacy for transfer of script. Pt vu and no further questions   ----- Message from Angeles sent at 5/26/2025  1:51 PM CDT -----  Regarding: callback  Pt would like a callback.MRN [id number] [phone]

## 2025-05-30 ENCOUNTER — PATIENT MESSAGE (OUTPATIENT)
Dept: OPTOMETRY | Facility: CLINIC | Age: 20
End: 2025-05-30
Payer: COMMERCIAL

## 2025-06-24 ENCOUNTER — PATIENT MESSAGE (OUTPATIENT)
Dept: OPTOMETRY | Facility: CLINIC | Age: 20
End: 2025-06-24
Payer: COMMERCIAL

## 2025-06-26 ENCOUNTER — TELEPHONE (OUTPATIENT)
Dept: OPTOMETRY | Facility: CLINIC | Age: 20
End: 2025-06-26
Payer: COMMERCIAL

## 2025-06-26 NOTE — TELEPHONE ENCOUNTER
Called and talked to patient mother about ordering new trial lenses ( Total 30 for astigmatism ) like the last trial, so we can refract over them to see why pt feels that power is to weak ( comfort was really good)   Alternatively Infuse for astigmatism would be available in higher cyl power if deemed necessary after overrefraction

## 2025-07-16 ENCOUNTER — TELEPHONE (OUTPATIENT)
Dept: OPTOMETRY | Facility: CLINIC | Age: 20
End: 2025-07-16
Payer: COMMERCIAL

## 2025-07-22 ENCOUNTER — OFFICE VISIT (OUTPATIENT)
Dept: OPTOMETRY | Facility: CLINIC | Age: 20
End: 2025-07-22
Payer: COMMERCIAL

## 2025-07-22 DIAGNOSIS — Z46.0 FITTING AND ADJUSTMENT OF SPECTACLES AND CONTACT LENSES: Primary | ICD-10-CM

## 2025-07-22 PROCEDURE — 92310 CONTACT LENS FITTING OU: CPT | Mod: CSM,,, | Performed by: OPTOMETRIST

## 2025-07-22 NOTE — PROGRESS NOTES
HPI    Jessenia Granados is a 20 y.o. female who returns for continued a   contact lens fitting. She has myopia of the right eye and astigmatism of   the left eye. Today, the following trials will be dispensed:    OD Total 30 8.4/14.2 -1.25  OS Total 30 for Astigmatism 8.6/14.5 +0.50 -2.25 x 180    (+)blurred vision  (--)Headaches  (--)diplopia  (--)flashes  (--)floaters  (--)pain  (--)Itching  (--)tearing  (--)burning  (--)Dryness  (--) OTC Drops  (--)Photophobia      Jessenia reports the lenses feel comfortable but she is still not seeing well   in the left eye compared to the right eye.  Last edited by Lobo Robledo, OD on 7/22/2025  2:06 PM.        For exam results, see encounter report    Assessment /Plan    Good fit, comfort and VA with Total 30 and Total 30 for Astigmatism    - CLRx per below for 1 month disposal/replacement    -Advised against overnight wear, risks of overnight wear explained;     -Systane Balance, Soothe XP, Refresh Optive Advanced artificial tears for comfort prn;    -Optifree Puremoist, Biotrue , Acuvue RevitaLens, or Clear Care or Clear Care Plus solutions recommended    Contact Lens Prescription (7/22/2025)          Brand Base Curve Diameter Sphere Cylinder Axis    Right Total 30 8.4 14.2 -1.25      Left Total 30 for Astigmatism 8.4 14.5 [place] -2.25 180      Expiration Date: 7/22/2026    Replacement: Monthly    Solutions: OptiFree PureMoist    Wearing Schedule: Daily Wear            2. Amblyopia, left eye --> stable  - Amblyogenic Factor(s): Anisometropia   - Continue full time optical correction       Patient education; RTC in 1 year with Cycloplegic refraction and DFE; Ok to instill Cycloplegic mix  after (normal) baseline workup, sooner as needed at [facility]

## 2025-07-24 ENCOUNTER — PATIENT MESSAGE (OUTPATIENT)
Dept: OPTOMETRY | Facility: CLINIC | Age: 20
End: 2025-07-24
Payer: COMMERCIAL

## 2025-07-30 DIAGNOSIS — Z30.41 ORAL CONTRACEPTIVE PILL SURVEILLANCE: ICD-10-CM

## 2025-08-03 RX ORDER — DROSPIRENONE AND ETHINYL ESTRADIOL 0.02-3(28)
1 KIT ORAL DAILY
Qty: 84 TABLET | Refills: 4 | Status: SHIPPED | OUTPATIENT
Start: 2025-08-03

## 2025-08-13 ENCOUNTER — PATIENT MESSAGE (OUTPATIENT)
Dept: OPTOMETRY | Facility: CLINIC | Age: 20
End: 2025-08-13
Payer: COMMERCIAL

## 2025-08-27 ENCOUNTER — TELEPHONE (OUTPATIENT)
Dept: OBSTETRICS AND GYNECOLOGY | Facility: CLINIC | Age: 20
End: 2025-08-27
Payer: COMMERCIAL

## 2025-08-27 DIAGNOSIS — Z30.41 ORAL CONTRACEPTIVE PILL SURVEILLANCE: ICD-10-CM

## 2025-08-27 RX ORDER — FLUCONAZOLE 150 MG/1
TABLET ORAL
Qty: 1 TABLET | Refills: 1 | Status: SHIPPED | OUTPATIENT
Start: 2025-08-27

## 2025-08-27 RX ORDER — DROSPIRENONE AND ETHINYL ESTRADIOL 0.02-3(28)
1 KIT ORAL
Qty: 84 TABLET | Refills: 2 | Status: SHIPPED | OUTPATIENT
Start: 2025-08-27